# Patient Record
(demographics unavailable — no encounter records)

---

## 2024-10-09 NOTE — ASSESSMENT
[FreeTextEntry1] : ASSESSMENT: The patient comes in today with acute onset of right wrist pain subsequent to fall on 9-24.  Unfortunately this complicated by a distal radius fracture.  Thankfully nondisplaced.  We have discussed possibility of displacement and surgical management.  At this point in time I do recommend proper immobilization nonweightbearing.  She will commence occupational therapy. We have discussed the thumb triggering.  She continues to brace in extension at home.  We have discussed other options including steroid injection.  She defers   The patient was adequately and thoroughly informed of my assessment of their current condition(s).  - This may diminish bodily function for the extremity. We discussed prognosis, tx modalities including operative and nonoperative options for the above diagnostic assessment. As always, 2nd opinion is always provided as an option.  When accessible, I was able to review other physicians note(s) including reviewing other tests, imaging results as well as personally view these results for my own interpretation.   The patient was adequately and thoroughly informed of my assessment of their current condition(s).  DISCUSSION: 1.  Right wrist distal radius fracture.  Immobilization.  Nonweightbearing.  OT prescription provided. 2.  History of right thumb triggering OT 3.  Follow-up 3 weeks x-ray right wrist

## 2024-10-09 NOTE — HISTORY OF PRESENT ILLNESS
[FreeTextEntry1] : Rhoda is a 75-year-old female presenting today after sustaining a fall injury to the right wrist on 9-24.  Denies presenting to the emergency room.  Has placed ice on the wrist.  She has been immobilized.

## 2024-10-09 NOTE — PHYSICAL EXAM
[de-identified] : Examination of the [right] wrist reveals tenderness at the level of the distal radius with swelling bruising and ecchymosis at the wrist  There is stiffness with digital motion otherwise there is an intact neurovascular examination. Examination of the hand(s)  particularly at the A1 of the right thumb reveals tenderness with a palpable click. [de-identified] : 3 views of right wrist were obtained today in my office and were seen by me and discussed with the patient.  We have discussed right wrist distal radius fracture nondisplaced with strong callus signs

## 2024-10-29 NOTE — PHYSICAL EXAM
[de-identified] : Examination of the [right] wrist reveals tenderness at the level of the distal radius with swelling bruising and ecchymosis at the wrist  There is stiffness with digital motion otherwise there is an intact neurovascular examination. Examination of the hand(s)  particularly at the A1 of the right thumb reveals tenderness with a palpable click. [de-identified] : 3 views of right wrist were obtained today in my office and were seen by me and discussed with the patient.  We have discussed right wrist distal radius fracture nondisplaced with strong callus signs

## 2024-10-29 NOTE — ASSESSMENT
[FreeTextEntry1] : ASSESSMENT: The patient comes in today with acute onset of right wrist pain subsequent to fall on 9-24.  Unfortunately this complicated by a distal radius fracture.  Thankfully nondisplaced.  We have discussed possibility of displacement and surgical management.  At this point in time I do recommend proper immobilization nonweightbearing.  She will commence occupational therapy. We have discussed the thumb triggering.  She continues to brace in extension at home.  We have discussed other options including steroid injection.  She defers   The patient was adequately and thoroughly informed of my assessment of their current condition(s).  - This may diminish bodily function for the extremity. We discussed prognosis, tx modalities including operative and nonoperative options for the above diagnostic assessment. As always, 2nd opinion is always provided as an option.  When accessible, I was able to review other physicians note(s) including reviewing other tests, imaging results as well as personally view these results for my own interpretation.   The patient was adequately and thoroughly informed of my assessment of their current condition(s).  DISCUSSION: 1.  Right wrist distal radius fracture.  Immobilization.  Nonweightbearing.  OT 2.  History of right thumb triggering OT 3.  Follow-up 4 weeks x-ray right wrist

## 2024-10-29 NOTE — HISTORY OF PRESENT ILLNESS
[FreeTextEntry1] : Rhoda is a 75-year-old female presenting today after sustaining a fall injury to the right wrist on 9-24.  Denies presenting to the emergency room.  Has placed ice on the wrist.  She has been immobilized. Describes improvement however persistence of right thumb discomfort and triggering.

## 2024-12-04 NOTE — PHYSICAL EXAM
[de-identified] : Examination of the [right] wrist reveals significant improvement Examination of the hand(s)  particularly at the A1 of the right thumb reveals tenderness with triggering [de-identified] : 3 views of right wrist were obtained today in my office and were seen by me and discussed with the patient.  We have discussed right wrist distal radius fracture nondisplaced with strong callus signs

## 2024-12-04 NOTE — ASSESSMENT
[FreeTextEntry1] : ASSESSMENT: The patient comes in today with acute onset of right wrist pain subsequent to fall on 9-24.  Unfortunately this complicated by a distal radius fracture.  Thankfully nondisplaced.  This shows great signs of callus formation and healing. We have discussed the thumb triggering.  She continues to brace in extension at home.  We have discussed other options including steroid injection.  She defers.  We will refer occupational therapy for exacerbation   The patient was adequately and thoroughly informed of my assessment of their current condition(s).  - This may diminish bodily function for the extremity. We discussed prognosis, tx modalities including operative and nonoperative options for the above diagnostic assessment. As always, 2nd opinion is always provided as an option.  When accessible, I was able to review other physicians note(s) including reviewing other tests, imaging results as well as personally view these results for my own interpretation.   The patient was adequately and thoroughly informed of my assessment of their current condition(s).  DISCUSSION: 1.  Right wrist distal radius fracture stable. 2.  History of right thumb triggering OT prescription renewal